# Patient Record
Sex: MALE | Race: BLACK OR AFRICAN AMERICAN | NOT HISPANIC OR LATINO | URBAN - METROPOLITAN AREA
[De-identification: names, ages, dates, MRNs, and addresses within clinical notes are randomized per-mention and may not be internally consistent; named-entity substitution may affect disease eponyms.]

---

## 2017-10-28 ENCOUNTER — EMERGENCY (EMERGENCY)
Facility: HOSPITAL | Age: 67
LOS: 0 days | Discharge: HOME | End: 2017-10-28
Attending: EMERGENCY MEDICINE
Payer: MEDICARE

## 2017-10-28 VITALS
TEMPERATURE: 98 F | DIASTOLIC BLOOD PRESSURE: 79 MMHG | RESPIRATION RATE: 16 BRPM | HEART RATE: 80 BPM | OXYGEN SATURATION: 96 % | WEIGHT: 179.9 LBS | HEIGHT: 69 IN | SYSTOLIC BLOOD PRESSURE: 166 MMHG

## 2017-10-28 VITALS
DIASTOLIC BLOOD PRESSURE: 70 MMHG | RESPIRATION RATE: 16 BRPM | TEMPERATURE: 98 F | OXYGEN SATURATION: 98 % | SYSTOLIC BLOOD PRESSURE: 160 MMHG | HEART RATE: 76 BPM

## 2017-10-28 DIAGNOSIS — S43.014A ANTERIOR DISLOCATION OF RIGHT HUMERUS, INITIAL ENCOUNTER: ICD-10-CM

## 2017-10-28 DIAGNOSIS — S43.034A INFERIOR DISLOCATION OF RIGHT HUMERUS, INITIAL ENCOUNTER: ICD-10-CM

## 2017-10-28 DIAGNOSIS — I25.10 ATHEROSCLEROTIC HEART DISEASE OF NATIVE CORONARY ARTERY WITHOUT ANGINA PECTORIS: ICD-10-CM
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
## 2017-10-28 DIAGNOSIS — M25.521 PAIN IN RIGHT ELBOW: ICD-10-CM

## 2017-10-28 DIAGNOSIS — M25.511 PAIN IN RIGHT SHOULDER: ICD-10-CM

## 2017-10-28 PROCEDURE — 73030 X-RAY EXAM OF SHOULDER: CPT | Mod: 26,77,RT

## 2017-10-28 PROCEDURE — 99284 EMERGENCY DEPT VISIT MOD MDM: CPT

## 2017-10-28 PROCEDURE — 73060 X-RAY EXAM OF HUMERUS: CPT | Mod: 26,RT

## 2017-10-28 PROCEDURE — 73080 X-RAY EXAM OF ELBOW: CPT | Mod: 26,RT

## 2017-10-28 PROCEDURE — 73030 X-RAY EXAM OF SHOULDER: CPT | Mod: 26,RT

## 2017-10-28 RX ORDER — MORPHINE SULFATE 50 MG/1
2 CAPSULE, EXTENDED RELEASE ORAL ONCE
Qty: 0 | Refills: 0 | Status: DISCONTINUED | OUTPATIENT
Start: 2017-10-28 | End: 2017-10-28

## 2017-10-28 RX ORDER — ACETAMINOPHEN 500 MG
650 TABLET ORAL ONCE
Qty: 0 | Refills: 0 | Status: COMPLETED | OUTPATIENT
Start: 2017-10-28 | End: 2017-10-28

## 2017-10-28 RX ADMIN — MORPHINE SULFATE 2 MILLIGRAM(S): 50 CAPSULE, EXTENDED RELEASE ORAL at 12:08

## 2017-10-28 RX ADMIN — Medication 650 MILLIGRAM(S): at 11:02

## 2017-10-28 NOTE — ED PROVIDER NOTE - OBJECTIVE STATEMENT
66 y/o mal ehx htn/cad c/o right shoulder/upper arm pain s/p mechanical trip and fall. States fell with arm somewhat stretched out in front of him. No loc, no hit head, no neck pain, no vomiting, no a/c. Denies cp/sob/abd pain, other extremity pain, neuro symptoms. Only pain is around shoulder region, nothing by clavicle.

## 2017-10-28 NOTE — ED ADULT NURSE NOTE - OBJECTIVE STATEMENT
Pt c/o of right shoulder pain. As per Pt " I slipped and fall while in the store. Denies any Head injury or LOC. Pt is not on any blood thinner

## 2017-10-28 NOTE — CONSULT NOTE ADULT - ASSESSMENT
A/P: 67y Male w R shoulder dislocation sp closed reduction  Pain control  NWB RUE in sling, keep c/d/i  Ice  Active movement of fingers/wrist/elbow encouraged  Possible need for surgical intervention in future discussed with patient  Follow up with Dr. Pang within 1 week, call office for appointment  Ortho stable for DC

## 2017-10-28 NOTE — ED PROVIDER NOTE - MEDICAL DECISION MAKING DETAILS
pt with shoulder dislocation ?bankart/hill sachs pre reduction. ortho consulted, to reduce. neurovascularly intact. no other injuries. likely reduce, sling, f/u ortho.

## 2017-10-28 NOTE — ED PROVIDER NOTE - PROGRESS NOTE DETAILS
Gabriela: ortho consulted, with pt now. Gabriela: ortho reduced, pt in shoulder immobilizer, feeling well, awake from valium, taking an uber home, sitting up eating without complaints, given results/ortho number and told to call asap and f/u this week, stay in immobilizer until sees doc within 48 hours.

## 2017-10-28 NOTE — CONSULT NOTE ADULT - SUBJECTIVE AND OBJECTIVE BOX
67y Male right hand dominant presents c/o R shoulder pain sp mechanical fall this morning. Denies Headstrike/LOC. Denies numbness, tingling paresthesias in affected extremity. Able to ambulate after fall. No other orthopedic injuries at this time    PAST MEDICAL & SURGICAL HISTORY:  HTN (hypertension)    MEDICATIONS  (STANDING):    Allergies    No Known Allergies    Intolerances      Imaging: XR demonstates R shoulder GHJ anterior dislocation    Vital Signs Last 24 Hrs  T(C): 36.7 (10-28-17 @ 10:11), Max: 36.7 (10-28-17 @ 10:11)  T(F): 98 (10-28-17 @ 10:11), Max: 98 (10-28-17 @ 10:11)  HR: 80 (10-28-17 @ 10:11) (80 - 80)  BP: 166/79 (10-28-17 @ 10:11) (166/79 - 166/79)  RR: 16 (10-28-17 @ 10:11) (16 - 16)  SpO2: 96% (10-28-17 @ 10:11) (96% - 96%)  Gen: NAD    RUE: Skin intact, +anterior shoulder fullness, +sulcus sign, unable to range shoulder 2/2 pain, +ain/pin/m/r/u function, SILT C5-T1, radial pulse intact, compartments soft, brisk cap refill     Procedure: 10cc 1% lidocaine with 10cc sterile saline injected under sterile procedure into R shoulder joint. Closed reduction performed. Post procedure imaging demonstrates located shoulder joint. Post procedure exam demonstrated NV intact.

## 2022-09-25 ENCOUNTER — EMERGENCY (EMERGENCY)
Facility: HOSPITAL | Age: 72
LOS: 0 days | Discharge: HOME | End: 2022-09-25
Attending: EMERGENCY MEDICINE

## 2022-09-25 VITALS
DIASTOLIC BLOOD PRESSURE: 92 MMHG | HEIGHT: 69 IN | OXYGEN SATURATION: 100 % | TEMPERATURE: 98 F | SYSTOLIC BLOOD PRESSURE: 198 MMHG | WEIGHT: 201.94 LBS | HEART RATE: 71 BPM | RESPIRATION RATE: 18 BRPM

## 2022-09-25 VITALS
HEART RATE: 78 BPM | SYSTOLIC BLOOD PRESSURE: 176 MMHG | DIASTOLIC BLOOD PRESSURE: 89 MMHG | OXYGEN SATURATION: 98 % | RESPIRATION RATE: 18 BRPM | TEMPERATURE: 98 F

## 2022-09-25 DIAGNOSIS — R11.2 NAUSEA WITH VOMITING, UNSPECIFIED: ICD-10-CM

## 2022-09-25 DIAGNOSIS — R11.10 VOMITING, UNSPECIFIED: ICD-10-CM

## 2022-09-25 DIAGNOSIS — Z20.822 CONTACT WITH AND (SUSPECTED) EXPOSURE TO COVID-19: ICD-10-CM

## 2022-09-25 DIAGNOSIS — I10 ESSENTIAL (PRIMARY) HYPERTENSION: ICD-10-CM

## 2022-09-25 DIAGNOSIS — I25.10 ATHEROSCLEROTIC HEART DISEASE OF NATIVE CORONARY ARTERY WITHOUT ANGINA PECTORIS: ICD-10-CM

## 2022-09-25 LAB
ALBUMIN SERPL ELPH-MCNC: 4.2 G/DL — SIGNIFICANT CHANGE UP (ref 3.3–5)
ALP SERPL-CCNC: 68 U/L — SIGNIFICANT CHANGE UP (ref 40–120)
ALT FLD-CCNC: 19 U/L — SIGNIFICANT CHANGE UP (ref 12–78)
ANION GAP SERPL CALC-SCNC: 5 MMOL/L — SIGNIFICANT CHANGE UP (ref 5–17)
AST SERPL-CCNC: 26 U/L — SIGNIFICANT CHANGE UP (ref 15–37)
BASOPHILS # BLD AUTO: 0.01 K/UL — SIGNIFICANT CHANGE UP (ref 0–0.2)
BASOPHILS NFR BLD AUTO: 0.2 % — SIGNIFICANT CHANGE UP (ref 0–2)
BILIRUB SERPL-MCNC: 0.5 MG/DL — SIGNIFICANT CHANGE UP (ref 0.2–1.2)
BUN SERPL-MCNC: 20 MG/DL — SIGNIFICANT CHANGE UP (ref 7–23)
CALCIUM SERPL-MCNC: 9.3 MG/DL — SIGNIFICANT CHANGE UP (ref 8.5–10.1)
CHLORIDE SERPL-SCNC: 105 MMOL/L — SIGNIFICANT CHANGE UP (ref 96–108)
CO2 SERPL-SCNC: 29 MMOL/L — SIGNIFICANT CHANGE UP (ref 22–31)
CREAT SERPL-MCNC: 0.95 MG/DL — SIGNIFICANT CHANGE UP (ref 0.5–1.3)
EGFR: 85 ML/MIN/1.73M2 — SIGNIFICANT CHANGE UP
EOSINOPHIL # BLD AUTO: 0.01 K/UL — SIGNIFICANT CHANGE UP (ref 0–0.5)
EOSINOPHIL NFR BLD AUTO: 0.2 % — SIGNIFICANT CHANGE UP (ref 0–6)
FLUAV AG NPH QL: SIGNIFICANT CHANGE UP
FLUBV AG NPH QL: SIGNIFICANT CHANGE UP
GLUCOSE SERPL-MCNC: 108 MG/DL — HIGH (ref 70–99)
HCT VFR BLD CALC: 40.1 % — SIGNIFICANT CHANGE UP (ref 39–50)
HGB BLD-MCNC: 13.2 G/DL — SIGNIFICANT CHANGE UP (ref 13–17)
IMM GRANULOCYTES NFR BLD AUTO: 0.4 % — SIGNIFICANT CHANGE UP (ref 0–0.9)
LIDOCAIN IGE QN: 125 U/L — SIGNIFICANT CHANGE UP (ref 73–393)
LYMPHOCYTES # BLD AUTO: 0.67 K/UL — LOW (ref 1–3.3)
LYMPHOCYTES # BLD AUTO: 13.6 % — SIGNIFICANT CHANGE UP (ref 13–44)
MAGNESIUM SERPL-MCNC: 1.9 MG/DL — SIGNIFICANT CHANGE UP (ref 1.6–2.6)
MCHC RBC-ENTMCNC: 29.7 PG — SIGNIFICANT CHANGE UP (ref 27–34)
MCHC RBC-ENTMCNC: 32.9 G/DL — SIGNIFICANT CHANGE UP (ref 32–36)
MCV RBC AUTO: 90.1 FL — SIGNIFICANT CHANGE UP (ref 80–100)
MONOCYTES # BLD AUTO: 0.22 K/UL — SIGNIFICANT CHANGE UP (ref 0–0.9)
MONOCYTES NFR BLD AUTO: 4.5 % — SIGNIFICANT CHANGE UP (ref 2–14)
NEUTROPHILS # BLD AUTO: 3.98 K/UL — SIGNIFICANT CHANGE UP (ref 1.8–7.4)
NEUTROPHILS NFR BLD AUTO: 81.1 % — HIGH (ref 43–77)
NRBC # BLD: 0 /100 WBCS — SIGNIFICANT CHANGE UP (ref 0–0)
PLATELET # BLD AUTO: 191 K/UL — SIGNIFICANT CHANGE UP (ref 150–400)
POTASSIUM SERPL-MCNC: 5.1 MMOL/L — SIGNIFICANT CHANGE UP (ref 3.5–5.3)
POTASSIUM SERPL-SCNC: 5.1 MMOL/L — SIGNIFICANT CHANGE UP (ref 3.5–5.3)
PROT SERPL-MCNC: 7.9 GM/DL — SIGNIFICANT CHANGE UP (ref 6–8.3)
RBC # BLD: 4.45 M/UL — SIGNIFICANT CHANGE UP (ref 4.2–5.8)
RBC # FLD: 13.6 % — SIGNIFICANT CHANGE UP (ref 10.3–14.5)
SARS-COV-2 RNA SPEC QL NAA+PROBE: SIGNIFICANT CHANGE UP
SODIUM SERPL-SCNC: 139 MMOL/L — SIGNIFICANT CHANGE UP (ref 135–145)
TROPONIN I, HIGH SENSITIVITY RESULT: 11.4 NG/L — SIGNIFICANT CHANGE UP
WBC # BLD: 4.91 K/UL — SIGNIFICANT CHANGE UP (ref 3.8–10.5)
WBC # FLD AUTO: 4.91 K/UL — SIGNIFICANT CHANGE UP (ref 3.8–10.5)

## 2022-09-25 PROCEDURE — 99284 EMERGENCY DEPT VISIT MOD MDM: CPT

## 2022-09-25 RX ORDER — SODIUM CHLORIDE 9 MG/ML
2000 INJECTION, SOLUTION INTRAVENOUS ONCE
Refills: 0 | Status: COMPLETED | OUTPATIENT
Start: 2022-09-25 | End: 2022-09-25

## 2022-09-25 RX ORDER — ONDANSETRON 8 MG/1
8 TABLET, FILM COATED ORAL ONCE
Refills: 0 | Status: COMPLETED | OUTPATIENT
Start: 2022-09-25 | End: 2022-09-25

## 2022-09-25 RX ORDER — ONDANSETRON 8 MG/1
1 TABLET, FILM COATED ORAL
Qty: 10 | Refills: 0
Start: 2022-09-25 | End: 2022-09-29

## 2022-09-25 RX ADMIN — ONDANSETRON 8 MILLIGRAM(S): 8 TABLET, FILM COATED ORAL at 18:22

## 2022-09-25 RX ADMIN — ONDANSETRON 8 MILLIGRAM(S): 8 TABLET, FILM COATED ORAL at 20:32

## 2022-09-25 RX ADMIN — SODIUM CHLORIDE 2000 MILLILITER(S): 9 INJECTION, SOLUTION INTRAVENOUS at 18:22

## 2022-09-25 NOTE — ED ADULT TRIAGE NOTE - CHIEF COMPLAINT QUOTE
pt c/o vomiting started this am. zofran 8mg ivp given via right hand #20G iv heplock. pt continuous vomiting. pt denies abdominal pain, diarrhea. hx: htn, cardiac stent x 2.

## 2022-09-25 NOTE — ED PROVIDER NOTE - PATIENT PORTAL LINK FT
You can access the FollowMyHealth Patient Portal offered by [facility] by registering at the following website: [email or web address]. By joining [facility]’s FollowMyHealth portal, you will also be able to view your health information using other applications (apps) compatible with our system.

## 2022-09-25 NOTE — ED PROVIDER NOTE - PROGRESS NOTE DETAILS
[place]: Pt care signed out to me at change of shift, pending labs. W/o w/u significant abnormalities. On re-eval, resting comfortably, in NAD. Pt tolerates PO intake in the ED. Stable for d/c home. Given script for Zofran. Instructed for close outpatient PCP f/u. Return signs / symptoms d/w pt, girlfriend. They understand / agree w/ this plan.

## 2022-09-25 NOTE — ED PROVIDER NOTE - OBJECTIVE STATEMENT
72y M with PMHx of HTN, coronary artery disease, presenting to the ED c/o of vomiting x1 day. Patient states vomiting occurred today while in [employer]. Of note, the patient reportedly consumed juares goat last night. Denies abd pain, n/d, fever, or chills.

## 2022-09-25 NOTE — ED ADULT NURSE NOTE - OBJECTIVE STATEMENT
c/o sudden onset of dizziness followed by n/v. Admits to having curried goat for dinner last night. Denies abdominal pains.

## 2022-09-26 PROBLEM — I10 ESSENTIAL (PRIMARY) HYPERTENSION: Chronic | Status: ACTIVE | Noted: 2017-10-28

## 2022-10-13 NOTE — ED ADULT NURSE NOTE - CINV DISCH TEACH PARTICIP
Patient requesting refill(s) of: lisinopril 20 mg daily    Last filled: 4/22/2022 #90 x 1   Last appt: 10/7/2022  Next appt: 1/30/2023  Pharmacy: [facility]
Patient

## 2023-06-21 PROBLEM — Z00.00 ENCOUNTER FOR PREVENTIVE HEALTH EXAMINATION: Status: ACTIVE | Noted: 2023-06-21

## 2023-07-26 ENCOUNTER — APPOINTMENT (OUTPATIENT)
Dept: HEART AND VASCULAR | Facility: CLINIC | Age: 73
End: 2023-07-26
Payer: MEDICARE

## 2023-07-26 ENCOUNTER — NON-APPOINTMENT (OUTPATIENT)
Age: 73
End: 2023-07-26

## 2023-07-26 VITALS
OXYGEN SATURATION: 96 % | SYSTOLIC BLOOD PRESSURE: 144 MMHG | BODY MASS INDEX: 30.04 KG/M2 | DIASTOLIC BLOOD PRESSURE: 72 MMHG | HEIGHT: 69 IN | WEIGHT: 202.8 LBS | HEART RATE: 84 BPM

## 2023-07-26 DIAGNOSIS — R93.1 ABNORMAL FINDINGS ON DIAGNOSTIC IMAGING OF HEART AND CORONARY CIRCULATION: ICD-10-CM

## 2023-07-26 DIAGNOSIS — E78.00 PURE HYPERCHOLESTEROLEMIA, UNSPECIFIED: ICD-10-CM

## 2023-07-26 DIAGNOSIS — I25.10 ATHEROSCLEROTIC HEART DISEASE OF NATIVE CORONARY ARTERY W/OUT ANGINA PECTORIS: ICD-10-CM

## 2023-07-26 DIAGNOSIS — R73.03 PREDIABETES.: ICD-10-CM

## 2023-07-26 DIAGNOSIS — Z78.9 OTHER SPECIFIED HEALTH STATUS: ICD-10-CM

## 2023-07-26 PROCEDURE — 99204 OFFICE O/P NEW MOD 45 MIN: CPT

## 2023-07-26 RX ORDER — ROSUVASTATIN CALCIUM 40 MG/1
40 TABLET, FILM COATED ORAL DAILY
Qty: 90 | Refills: 3 | Status: ACTIVE | COMMUNITY
Start: 2023-07-26 | End: 1900-01-01

## 2023-07-26 RX ORDER — METFORMIN HYDROCHLORIDE 500 MG/1
500 TABLET, COATED ORAL
Qty: 90 | Refills: 3 | Status: ACTIVE | COMMUNITY
Start: 2023-07-26 | End: 1900-01-01

## 2023-07-26 RX ORDER — METOPROLOL TARTRATE 50 MG/1
50 TABLET, FILM COATED ORAL DAILY
Refills: 0 | Status: ACTIVE | COMMUNITY

## 2023-07-26 NOTE — HISTORY OF PRESENT ILLNESS
[FreeTextEntry1] : 77 y/o M with a hx of HTN and CAD with 2 prior PCI done at [facility], who presents as a new patient visit. \par No CP, SOB, orthopnea, PND, LE edema. Occasionally with LE pain and tingling, but otherwise no cardiac complaints. non smoker. social alcohol consumption.\par No family hx of heart disease. \par No prior cath or echo reports available. \par EKG NSR, no ischemic changes, some TW flattening in V3-V4\par \par Meds: Toprol 25, Aspirin 81, Crestor (unknown dose)

## 2023-09-13 ENCOUNTER — APPOINTMENT (OUTPATIENT)
Dept: CT IMAGING | Facility: HOSPITAL | Age: 73
End: 2023-09-13

## 2023-09-13 ENCOUNTER — OUTPATIENT (OUTPATIENT)
Dept: OUTPATIENT SERVICES | Facility: HOSPITAL | Age: 73
LOS: 1 days | End: 2023-09-13
Payer: MEDICARE

## 2023-09-13 DIAGNOSIS — R93.1 ABNORMAL FINDINGS ON DIAGNOSTIC IMAGING OF HEART AND CORONARY CIRCULATION: ICD-10-CM

## 2023-09-13 PROCEDURE — 93306 TTE W/DOPPLER COMPLETE: CPT | Mod: 26

## 2023-09-13 PROCEDURE — 93306 TTE W/DOPPLER COMPLETE: CPT

## 2023-09-13 PROCEDURE — 75574 CT ANGIO HRT W/3D IMAGE: CPT | Mod: 26

## 2023-09-13 PROCEDURE — 75574 CT ANGIO HRT W/3D IMAGE: CPT

## 2023-09-19 VITALS
OXYGEN SATURATION: 100 % | HEIGHT: 69 IN | DIASTOLIC BLOOD PRESSURE: 62 MMHG | WEIGHT: 203.93 LBS | TEMPERATURE: 97 F | SYSTOLIC BLOOD PRESSURE: 130 MMHG | HEART RATE: 62 BPM | RESPIRATION RATE: 16 BRPM

## 2023-09-19 RX ORDER — CHLORHEXIDINE GLUCONATE 213 G/1000ML
1 SOLUTION TOPICAL ONCE
Refills: 0 | Status: DISCONTINUED | OUTPATIENT
Start: 2023-09-25 | End: 2023-10-09

## 2023-09-19 NOTE — H&P ADULT - NSHPLABSRESULTS_GEN_ALL_CORE
13.1   3.28  )-----------( 187      ( 25 Sep 2023 13:11 )             38.7       09-25    138  |  103  |  14  ----------------------------<  82  4.4   |  25  |  0.86    Ca    9.0      25 Sep 2023 12:29  Mg     2.1     09-25    TPro  7.2  /  Alb  4.4  /  TBili  0.6  /  DBili  x   /  AST  43<H>  /  ALT  30  /  AlkPhos  57  09-25      PT/INR - ( 25 Sep 2023 13:11 )   PT: 12.5 sec;   INR: 1.10          PTT - ( 25 Sep 2023 13:11 )  PTT:32.7 sec    CARDIAC MARKERS ( 25 Sep 2023 12:29 )  x     / x     / 764 U/L / x     / 4.2 ng/mL        Urinalysis Basic - ( 25 Sep 2023 12:29 )    Color: x / Appearance: x / SG: x / pH: x  Gluc: 82 mg/dL / Ketone: x  / Bili: x / Urobili: x   Blood: x / Protein: x / Nitrite: x   Leuk Esterase: x / RBC: x / WBC x   Sq Epi: x / Non Sq Epi: x / Bacteria: x        EKG: 13.1   3.28  )-----------( 187      ( 25 Sep 2023 13:11 )             38.7       09-25    138  |  103  |  14  ----------------------------<  82  4.4   |  25  |  0.86    Ca    9.0      25 Sep 2023 12:29  Mg     2.1     09-25    TPro  7.2  /  Alb  4.4  /  TBili  0.6  /  DBili  x   /  AST  43<H>  /  ALT  30  /  AlkPhos  57  09-25      PT/INR - ( 25 Sep 2023 13:11 )   PT: 12.5 sec;   INR: 1.10          PTT - ( 25 Sep 2023 13:11 )  PTT:32.7 sec    CARDIAC MARKERS ( 25 Sep 2023 12:29 )  x     / x     / 764 U/L / x     / 4.2 ng/mL        Urinalysis Basic - ( 25 Sep 2023 12:29 )    Color: x / Appearance: x / SG: x / pH: x  Gluc: 82 mg/dL / Ketone: x  / Bili: x / Urobili: x   Blood: x / Protein: x / Nitrite: x   Leuk Esterase: x / RBC: x / WBC x   Sq Epi: x / Non Sq Epi: x / Bacteria: x        EKG: sinus ruby 58 BPM with TWI V4-V6.

## 2023-09-19 NOTE — H&P ADULT - NSICDXPASTMEDICALHX_GEN_ALL_CORE_FT
PAST MEDICAL HISTORY:  CAD (coronary artery disease)     HLD (hyperlipidemia)     HTN (hypertension)     Systolic HF (heart failure)

## 2023-09-19 NOTE — H&P ADULT - ASSESSMENT
73 year old M, with PMHx HTN, HLD, pre-DM, CAD (s/p PCI x2 in past at [facility]), HFmrEF (40-45% 9/13) who in light of risk factors, CCS class III anginal equivalent symptoms and abnormal TTE and CCTA, now presents for cardiac catheterization with possible intervention if clinically indicated.     EKG: 			  ASA III	Mallampati class: II   Anginal Equivalent Class: III     -H/H = . Pt denies BRBPR, hematuria, hematochezia, melena. Pt endorses compliance w/ home Aspirin, stating last dose was 9/25/23. Pt loaded w/  Plavix 600 mg x1  -BUN/Cr =   -EF 40-45% by echo. Euvolemic on exam. IV NS @ 250cc bolus followed by 50cc/hr x 2 hrs started pre procedure    Sedation Plan:   Moderate  Patient Is Suitable Candidate For Sedation?     Yes    Risks & benefits of procedure and alternative therapy have been explained to the patient including but not limited to: allergic reaction, bleeding with possible need for blood transfusion, infection, renal and vascular compromise, limb damage, arrhythmia, stroke, vessel dissection/perforation, myocardial infarction, and emergent CABG. Informed consent obtained at bedside and included in chart.   73 year old M, with PMHx HTN, HLD, pre-DM, CAD (s/p PCI x2 in past at [facility]), HFmrEF (40-45% 9/13) who in light of risk factors, CCS class III anginal equivalent symptoms and abnormal TTE and CCTA, now presents for cardiac catheterization with possible intervention if clinically indicated.     EKG: 			  ASA III	Mallampati class: II   Anginal Equivalent Class: III     -H/H = . Pt denies BRBPR, hematuria, hematochezia, melena. Pt endorses compliance w/ home Aspirin, stating last dose was 9/25/23. Pt loaded w/  Plavix 600 mg x1  -BUN/Cr = 14/0.86  -EF 40-45% by echo. Euvolemic on exam. IV NS @ 250cc bolus followed by 50cc/hr x 2 hrs started pre procedure    Sedation Plan:   Moderate  Patient Is Suitable Candidate For Sedation?     Yes    Risks & benefits of procedure and alternative therapy have been explained to the patient including but not limited to: allergic reaction, bleeding with possible need for blood transfusion, infection, renal and vascular compromise, limb damage, arrhythmia, stroke, vessel dissection/perforation, myocardial infarction, and emergent CABG. Informed consent obtained at bedside and included in chart.

## 2023-09-19 NOTE — H&P ADULT - HISTORY OF PRESENT ILLNESS
Pharmacy: _______  Cardiologist: Dr EL Escobar  Escort: _______    73 year old M, with PMHx HTN, HLD, pre-DM, CAD (s/p PCI x2 in past at [facility]), HFmrEF (40-45% 9/13) presented to Dr Escobar with c/o progressively worsening EID, occurs with minimal exertion (now unable to ambulate two blocks), relieved by rest; not associated with CP. Patient otherwise denies ______ dizziness, palpitations, orthopnea/PND, leg swelling, LOC, bleeding, melena/hematochezia, fever, chills, URI symptoms, or recent illness. Patient underwent CCTA 9/13 revealing severe stenosis in RPL as well as in OM1 followed by patent stent; patent m-dLAD stent; calcified plaque in D1 where significant stenosis cannot be excluded; less than 25% stenosis of LMain; RPDA not well visualized. Patient also underwent TTE 9/13 which revealed mild symmetric LVH w/ LVEF 40-45%; apical septal, apical inferior, and apex segments akinetic, with apical lateral segment hypokineic; G1DD; aortic sclerosis w/o significant stenosis; otherwise normal TTE.     In light of risk factors, CCS class III anginal symptoms and abnormal TTE and CCTA, pt now presents for cardiac catheterization with possible intervention if clinically indicated.    Pharmacy: [email or web address] [id number] [facility] ([phone])   Cardiologist: Dr EL Escobar  Escort: Friend     73 year old M, with PMHx HTN, HLD, pre-DM, CAD (s/p PCI x2 in past at [facility]), HFmrEF (40-45% 9/13) presented to Dr Escobar with c/o progressively worsening EID, occurs with minimal exertion (now unable to ambulate two blocks), relieved by rest; not associated with CP. Patient otherwise denies CP, dizziness, palpitations, orthopnea/PND, leg swelling, LOC, bleeding, melena/hematochezia, fever, chills, URI symptoms, or recent illness. Patient underwent CCTA 9/13 revealing severe stenosis in RPL as well as in OM1 followed by patent stent; patent m-dLAD stent; calcified plaque in D1 where significant stenosis cannot be excluded; less than 25% stenosis of LMain; RPDA not well visualized. Patient also underwent TTE 9/13 which revealed mild symmetric LVH w/ LVEF 40-45%; apical septal, apical inferior, and apex segments akinetic, with apical lateral segment hypokineic; G1DD; aortic sclerosis w/o significant stenosis; otherwise normal TTE. In light of risk factors, CCS class III anginal equivalent symptoms and abnormal TTE and CCTA, pt now presents for cardiac catheterization with possible intervention if clinically indicated.

## 2023-09-25 ENCOUNTER — OUTPATIENT (OUTPATIENT)
Dept: OUTPATIENT SERVICES | Facility: HOSPITAL | Age: 73
LOS: 1 days | Discharge: HOME | End: 2023-09-25
Payer: MEDICARE

## 2023-09-25 LAB
A1C WITH ESTIMATED AVERAGE GLUCOSE RESULT: 6.3 % — HIGH (ref 4–5.6)
ALBUMIN SERPL ELPH-MCNC: 4.4 G/DL — SIGNIFICANT CHANGE UP (ref 3.3–5)
ALP SERPL-CCNC: 57 U/L — SIGNIFICANT CHANGE UP (ref 40–120)
ALT FLD-CCNC: 30 U/L — SIGNIFICANT CHANGE UP (ref 10–45)
ANION GAP SERPL CALC-SCNC: 10 MMOL/L — SIGNIFICANT CHANGE UP (ref 5–17)
APTT BLD: 32.7 SEC — SIGNIFICANT CHANGE UP (ref 24.5–35.6)
AST SERPL-CCNC: 43 U/L — HIGH (ref 10–40)
BASOPHILS # BLD AUTO: 0.01 K/UL — SIGNIFICANT CHANGE UP (ref 0–0.2)
BASOPHILS NFR BLD AUTO: 0.3 % — SIGNIFICANT CHANGE UP (ref 0–2)
BILIRUB SERPL-MCNC: 0.6 MG/DL — SIGNIFICANT CHANGE UP (ref 0.2–1.2)
BUN SERPL-MCNC: 14 MG/DL — SIGNIFICANT CHANGE UP (ref 7–23)
CALCIUM SERPL-MCNC: 9 MG/DL — SIGNIFICANT CHANGE UP (ref 8.4–10.5)
CHLORIDE SERPL-SCNC: 103 MMOL/L — SIGNIFICANT CHANGE UP (ref 96–108)
CHOLEST SERPL-MCNC: 100 MG/DL — SIGNIFICANT CHANGE UP
CK MB CFR SERPL CALC: 4.2 NG/ML — SIGNIFICANT CHANGE UP (ref 0–6.7)
CK SERPL-CCNC: 764 U/L — HIGH (ref 30–200)
CO2 SERPL-SCNC: 25 MMOL/L — SIGNIFICANT CHANGE UP (ref 22–31)
CREAT SERPL-MCNC: 0.86 MG/DL — SIGNIFICANT CHANGE UP (ref 0.5–1.3)
EGFR: 91 ML/MIN/1.73M2 — SIGNIFICANT CHANGE UP
EOSINOPHIL # BLD AUTO: 0.07 K/UL — SIGNIFICANT CHANGE UP (ref 0–0.5)
EOSINOPHIL NFR BLD AUTO: 2.1 % — SIGNIFICANT CHANGE UP (ref 0–6)
ESTIMATED AVERAGE GLUCOSE: 134 MG/DL — HIGH (ref 68–114)
GLUCOSE SERPL-MCNC: 82 MG/DL — SIGNIFICANT CHANGE UP (ref 70–99)
HCT VFR BLD CALC: 38.7 % — LOW (ref 39–50)
HCV AB S/CO SERPL IA: 0.04 S/CO — SIGNIFICANT CHANGE UP
HCV AB SERPL-IMP: SIGNIFICANT CHANGE UP
HDLC SERPL-MCNC: 32 MG/DL — LOW
HGB BLD-MCNC: 13.1 G/DL — SIGNIFICANT CHANGE UP (ref 13–17)
IMM GRANULOCYTES NFR BLD AUTO: 0 % — SIGNIFICANT CHANGE UP (ref 0–0.9)
INR BLD: 1.1 — SIGNIFICANT CHANGE UP (ref 0.85–1.18)
LIPID PNL WITH DIRECT LDL SERPL: 56 MG/DL — SIGNIFICANT CHANGE UP
LYMPHOCYTES # BLD AUTO: 1.63 K/UL — SIGNIFICANT CHANGE UP (ref 1–3.3)
LYMPHOCYTES # BLD AUTO: 49.7 % — HIGH (ref 13–44)
MAGNESIUM SERPL-MCNC: 2 MG/DL — SIGNIFICANT CHANGE UP (ref 1.6–2.6)
MAGNESIUM SERPL-MCNC: 2.1 MG/DL — SIGNIFICANT CHANGE UP (ref 1.6–2.6)
MCHC RBC-ENTMCNC: 31 PG — SIGNIFICANT CHANGE UP (ref 27–34)
MCHC RBC-ENTMCNC: 33.9 GM/DL — SIGNIFICANT CHANGE UP (ref 32–36)
MCV RBC AUTO: 91.5 FL — SIGNIFICANT CHANGE UP (ref 80–100)
MONOCYTES # BLD AUTO: 0.22 K/UL — SIGNIFICANT CHANGE UP (ref 0–0.9)
MONOCYTES NFR BLD AUTO: 6.7 % — SIGNIFICANT CHANGE UP (ref 2–14)
NEUTROPHILS # BLD AUTO: 1.35 K/UL — LOW (ref 1.8–7.4)
NEUTROPHILS NFR BLD AUTO: 41.2 % — LOW (ref 43–77)
NON HDL CHOLESTEROL: 68 MG/DL — SIGNIFICANT CHANGE UP
NRBC # BLD: 0 /100 WBCS — SIGNIFICANT CHANGE UP (ref 0–0)
PLATELET # BLD AUTO: 187 K/UL — SIGNIFICANT CHANGE UP (ref 150–400)
POTASSIUM SERPL-MCNC: 4.4 MMOL/L — SIGNIFICANT CHANGE UP (ref 3.5–5.3)
POTASSIUM SERPL-SCNC: 4.4 MMOL/L — SIGNIFICANT CHANGE UP (ref 3.5–5.3)
PROT SERPL-MCNC: 7.2 G/DL — SIGNIFICANT CHANGE UP (ref 6–8.3)
PROTHROM AB SERPL-ACNC: 12.5 SEC — SIGNIFICANT CHANGE UP (ref 9.5–13)
RBC # BLD: 4.23 M/UL — SIGNIFICANT CHANGE UP (ref 4.2–5.8)
RBC # FLD: 13.8 % — SIGNIFICANT CHANGE UP (ref 10.3–14.5)
SODIUM SERPL-SCNC: 138 MMOL/L — SIGNIFICANT CHANGE UP (ref 135–145)
TRIGL SERPL-MCNC: 61 MG/DL — SIGNIFICANT CHANGE UP
WBC # BLD: 3.28 K/UL — LOW (ref 3.8–10.5)
WBC # FLD AUTO: 3.28 K/UL — LOW (ref 3.8–10.5)

## 2023-09-25 PROCEDURE — 86803 HEPATITIS C AB TEST: CPT

## 2023-09-25 PROCEDURE — 36415 COLL VENOUS BLD VENIPUNCTURE: CPT

## 2023-09-25 PROCEDURE — 83735 ASSAY OF MAGNESIUM: CPT

## 2023-09-25 PROCEDURE — 82550 ASSAY OF CK (CPK): CPT

## 2023-09-25 PROCEDURE — C1769: CPT

## 2023-09-25 PROCEDURE — 85025 COMPLETE CBC W/AUTO DIFF WBC: CPT

## 2023-09-25 PROCEDURE — 93571 IV DOP VEL&/PRESS C FLO 1ST: CPT | Mod: 26,52,LD

## 2023-09-25 PROCEDURE — 85347 COAGULATION TIME ACTIVATED: CPT

## 2023-09-25 PROCEDURE — 93454 CORONARY ARTERY ANGIO S&I: CPT | Mod: 26

## 2023-09-25 PROCEDURE — 99152 MOD SED SAME PHYS/QHP 5/>YRS: CPT

## 2023-09-25 PROCEDURE — 85610 PROTHROMBIN TIME: CPT

## 2023-09-25 PROCEDURE — 93010 ELECTROCARDIOGRAM REPORT: CPT | Mod: 59

## 2023-09-25 PROCEDURE — 80053 COMPREHEN METABOLIC PANEL: CPT

## 2023-09-25 PROCEDURE — 82553 CREATINE MB FRACTION: CPT

## 2023-09-25 PROCEDURE — C1887: CPT

## 2023-09-25 PROCEDURE — 83036 HEMOGLOBIN GLYCOSYLATED A1C: CPT

## 2023-09-25 PROCEDURE — C1894: CPT

## 2023-09-25 PROCEDURE — 80061 LIPID PANEL: CPT

## 2023-09-25 PROCEDURE — 93454 CORONARY ARTERY ANGIO S&I: CPT | Mod: 59

## 2023-09-25 PROCEDURE — 93799 UNLISTED CV SVC/PROCEDURE: CPT

## 2023-09-25 PROCEDURE — 85730 THROMBOPLASTIN TIME PARTIAL: CPT

## 2023-09-25 PROCEDURE — 93005 ELECTROCARDIOGRAM TRACING: CPT

## 2023-09-25 RX ORDER — SODIUM CHLORIDE 9 MG/ML
250 INJECTION INTRAMUSCULAR; INTRAVENOUS; SUBCUTANEOUS ONCE
Refills: 0 | Status: COMPLETED | OUTPATIENT
Start: 2023-09-25 | End: 2023-09-25

## 2023-09-25 RX ORDER — SODIUM CHLORIDE 9 MG/ML
500 INJECTION INTRAMUSCULAR; INTRAVENOUS; SUBCUTANEOUS
Refills: 0 | Status: DISCONTINUED | OUTPATIENT
Start: 2023-09-25 | End: 2023-09-25

## 2023-09-25 RX ORDER — METOPROLOL TARTRATE 50 MG
1 TABLET ORAL
Refills: 0 | DISCHARGE

## 2023-09-25 RX ORDER — CHOLECALCIFEROL (VITAMIN D3) 125 MCG
1 CAPSULE ORAL
Refills: 0 | DISCHARGE

## 2023-09-25 RX ORDER — METFORMIN HYDROCHLORIDE 850 MG/1
1 TABLET ORAL
Refills: 0 | DISCHARGE

## 2023-09-25 RX ORDER — CLOPIDOGREL BISULFATE 75 MG/1
600 TABLET, FILM COATED ORAL ONCE
Refills: 0 | Status: COMPLETED | OUTPATIENT
Start: 2023-09-25 | End: 2023-09-25

## 2023-09-25 RX ORDER — ASPIRIN/CALCIUM CARB/MAGNESIUM 324 MG
1 TABLET ORAL
Refills: 0 | DISCHARGE

## 2023-09-25 RX ORDER — ROSUVASTATIN CALCIUM 5 MG/1
1 TABLET ORAL
Refills: 0 | DISCHARGE

## 2023-09-25 RX ORDER — SODIUM CHLORIDE 9 MG/ML
500 INJECTION INTRAMUSCULAR; INTRAVENOUS; SUBCUTANEOUS
Refills: 0 | Status: DISCONTINUED | OUTPATIENT
Start: 2023-09-25 | End: 2023-10-09

## 2023-09-25 RX ADMIN — SODIUM CHLORIDE 50 MILLILITER(S): 9 INJECTION INTRAMUSCULAR; INTRAVENOUS; SUBCUTANEOUS at 15:28

## 2023-09-25 RX ADMIN — SODIUM CHLORIDE 50 MILLILITER(S): 9 INJECTION INTRAMUSCULAR; INTRAVENOUS; SUBCUTANEOUS at 13:29

## 2023-09-25 RX ADMIN — SODIUM CHLORIDE 500 MILLILITER(S): 9 INJECTION INTRAMUSCULAR; INTRAVENOUS; SUBCUTANEOUS at 13:29

## 2023-09-25 RX ADMIN — CLOPIDOGREL BISULFATE 600 MILLIGRAM(S): 75 TABLET, FILM COATED ORAL at 13:29

## 2023-09-25 NOTE — PROGRESS NOTE ADULT - SUBJECTIVE AND OBJECTIVE BOX
Interventional Cardiology PA SDA Discharge Note    Patient without complaints. Ambulated and voided without difficulties    Afebrile, VSS    Ext:    	  		        Right     Radial/brachial :  no  hematoma,  no   bleeding, dressing; C/D/I      Pulses:    intact RAD/brachial to baseline     A/P:        74 y/o M, with PMHx HTN, HLD, pre-DM, CAD (s/p PCI x2 in past at [facility]), HFmrEF (40-45% 9/13) presented to Dr Escobar with c/o progressively worsening EID, occurs with minimal exertion (now unable to ambulate two blocks), relieved by rest; not associated with CP. Patient otherwise denies CP, dizziness, palpitations, orthopnea/PND, leg swelling, LOC, bleeding, melena/hematochezia, fever, chills, URI symptoms, or recent illness. Patient underwent CCTA 9/13 revealing severe stenosis in RPL as well as in OM1 followed by patent stent; patent m-dLAD stent; calcified plaque in D1 where significant stenosis cannot be excluded; less than 25% stenosis of LMain; RPDA not well visualized. Patient also underwent TTE 9/13 which revealed mild symmetric LVH w/ LVEF 40-45%; apical septal, apical inferior, and apex segments akinetic, with apical lateral segment hypokineic; G1DD; aortic sclerosis w/o significant stenosis; otherwise normal TTE. In light of risk factors, CCS class III anginal equivalent symptoms and abnormal TTE and CCTA, pt now presents for cardiac catheterization with possible intervention if clinically indicated.     Pt s/p Diag Cardiac cath 9/25/23. :  mod Dz mLAD (IFR neg 0.94), apical LAD  (very distal, no intervention needed). Lcx 30%, RCA 50-60%), R Rad access. As per IC fellow, plan to  uptritrate meds at o/p office in 2-4 wks)            1.	Stable for discharge as per attending Dr. Maher________ after bed rest, pt voids, wrist/brachial stable and 30 minutes of ambulation.  2.	Follow-up with PMD/Cardiologist ____Dr. Escobar_______ in 1-2 weeks  3.	Discharged forms signed and copies in chart

## 2023-09-26 LAB
ISTAT INR: 1.3 — HIGH (ref 0.88–1.16)
ISTAT PT: 15.7 SEC — HIGH (ref 10–12.9)

## 2023-10-02 DIAGNOSIS — I50.89 OTHER HEART FAILURE: ICD-10-CM

## 2023-10-02 DIAGNOSIS — I10 ESSENTIAL (PRIMARY) HYPERTENSION: ICD-10-CM

## 2023-10-02 DIAGNOSIS — I25.110 ATHEROSCLEROTIC HEART DISEASE OF NATIVE CORONARY ARTERY WITH UNSTABLE ANGINA PECTORIS: ICD-10-CM

## 2023-10-02 DIAGNOSIS — Z95.5 PRESENCE OF CORONARY ANGIOPLASTY IMPLANT AND GRAFT: ICD-10-CM

## 2023-10-02 DIAGNOSIS — E78.5 HYPERLIPIDEMIA, UNSPECIFIED: ICD-10-CM

## 2023-10-02 LAB
ISTAT ACTK (ACTIVATED CLOTTING TIME KAOLIN): 275 SEC — HIGH (ref 74–137)
ISTAT ACTK (ACTIVATED CLOTTING TIME KAOLIN): 811 SEC — HIGH (ref 74–137)

## 2023-10-11 ENCOUNTER — NON-APPOINTMENT (OUTPATIENT)
Age: 73
End: 2023-10-11

## 2023-12-28 ENCOUNTER — APPOINTMENT (OUTPATIENT)
Dept: GASTROENTEROLOGY | Facility: CLINIC | Age: 73
End: 2023-12-28
Payer: MEDICARE

## 2023-12-28 VITALS
BODY MASS INDEX: 29.77 KG/M2 | SYSTOLIC BLOOD PRESSURE: 145 MMHG | TEMPERATURE: 97.4 F | HEIGHT: 69 IN | WEIGHT: 201 LBS | DIASTOLIC BLOOD PRESSURE: 90 MMHG | RESPIRATION RATE: 15 BRPM | OXYGEN SATURATION: 98 % | HEART RATE: 69 BPM

## 2023-12-28 DIAGNOSIS — Z12.11 ENCOUNTER FOR SCREENING FOR MALIGNANT NEOPLASM OF COLON: ICD-10-CM

## 2023-12-28 PROBLEM — I50.20 UNSPECIFIED SYSTOLIC (CONGESTIVE) HEART FAILURE: Chronic | Status: ACTIVE | Noted: 2023-09-19

## 2023-12-28 PROBLEM — E78.5 HYPERLIPIDEMIA, UNSPECIFIED: Chronic | Status: ACTIVE | Noted: 2023-09-19

## 2023-12-28 PROBLEM — I25.10 ATHEROSCLEROTIC HEART DISEASE OF NATIVE CORONARY ARTERY WITHOUT ANGINA PECTORIS: Chronic | Status: ACTIVE | Noted: 2023-09-19

## 2023-12-28 PROCEDURE — 99203 OFFICE O/P NEW LOW 30 MIN: CPT

## 2023-12-28 RX ORDER — SODIUM SULFATE, POTASSIUM SULFATE AND MAGNESIUM SULFATE 1.6; 3.13; 17.5 G/177ML; G/177ML; G/177ML
17.5-3.13-1.6 SOLUTION ORAL
Qty: 1 | Refills: 0 | Status: ACTIVE | COMMUNITY
Start: 2023-12-28 | End: 1900-01-01

## 2023-12-28 NOTE — HISTORY OF PRESENT ILLNESS
[FreeTextEntry1] : 73M with pmh pre DM, HLD, CAD s/p PCI at [facility] (2017) referred by PCP for colon cancer screening.  Denies any GI complaints: diarrhea, constipation, rectal bleeding, melena, odynophagia, dysphagia, heart burn, epigastric/ abdominal pain, n/v, unintentional weight loss  Last colonoscopy many years ago in [place], cannot remember when. Noted to have polyps  Family Hx: no Gi malignancy; brother with lymphoma Tob never ETOH never Drug use never Meds: ASA 81, metformin, rosuvastatin, metoprolol, vit d, vit c  Notes EID first few min of walking that then resolves after a few minutes and then becomes normal afterwards.  Underwent complete cardiac work up with Cath, echo and all unremarkable.

## 2023-12-28 NOTE — END OF VISIT
[] : Fellow [FreeTextEntry3] : 73M with a h/o pre-DM, HLD, CAD s/p PCI at [facility] (2017) referred by PCP for colon cancer screening. Schedule at [facility] with suprep.   Joe Eaton MD Gastroenterology  [Time Spent: ___ minutes] : I have spent [unfilled] minutes of time on the encounter.

## 2023-12-28 NOTE — HISTORY OF PRESENT ILLNESS
[FreeTextEntry1] : 73M with pmh pre DM, HLD, CAD s/p PCI at [facility] (2017) referred by PCP for colon cancer screening.  Denies any GI complaints: diarrhea, constipation, rectal bleeding, melena, odynophagia, dysphagia, heart burn, epigastric/ abdominal pain, n/v, unintentional weight loss  Last colonoscopy many years ago in [place], cannot remember when. Noted to have polyps  Family Hx: no Gi malignancy; brother with lymphoma Tob never ETOH never Drug use never Meds: ASA 81, metformin, rosuvastatin, metoprolol, vit d, vit c  Notes EID first few min of walking that then resolves after a few minutes and then becomes normal afterwards.  Underwent complete cardiac work up with Cath, echo and all unremarkable.

## 2023-12-28 NOTE — ASSESSMENT
[FreeTextEntry1] : 73M with pmh pre DM, HLD, CAD s/p PCI at [facility] (2017) referred by PCP for colon cancer screening.  # Colon cancer screening - Plan for colonoscopy @[facility] with suprep - Details of procedure, including risks of anesthesia and procedure which include but not limited to bleeding, perforation, infection, missed polyp discussed and patient gives verbal consent. Written consent to be obtained at time of procedure. - Prep instructions discussed at length - Pt advised an escort is needed to  from procedure   Nae Cedillo MD GI Fellow

## 2023-12-28 NOTE — PHYSICAL EXAM
[Abnormal Walk] : normal gait [Normal Color / Pigmentation] : normal skin color and pigmentation [Normal] : oriented to person, place, and time

## 2023-12-28 NOTE — ASSESSMENT
[FreeTextEntry1] : 73M with pmh pre DM, HLD, CAD s/p PCI at [facility] (2017) referred by PCP for colon cancer screening.  # Colon cancer screening - [facility] with suprep - Details of procedure, including risks of anesthesia and procedure which include but not limited to bleeding, perforation, infection, missed polyp discussed and patient gives verbal consent. Written consent to be obtained at time of procedure. - Prep instructions discussed at length - Pt advised an escort is needed to  from procedure   Nae Cedillo MD GI Fellow

## 2023-12-28 NOTE — END OF VISIT
[] : Fellow [FreeTextEntry3] : 73M with a h/o pre-DM, HLD, CAD s/p PCI at [facility] (2017) referred by PCP for colon cancer screening. Schedule at [facility] with suprep.   Joe Eaton MD Gastroenterology  [Time Spent: ___ minutes] : I have spent [unfilled] minutes of time on the encounter.

## 2024-03-11 ENCOUNTER — APPOINTMENT (OUTPATIENT)
Dept: GASTROENTEROLOGY | Facility: HOSPITAL | Age: 74
End: 2024-03-11

## 2024-04-30 ENCOUNTER — OUTPATIENT (OUTPATIENT)
Dept: OUTPATIENT SERVICES | Facility: HOSPITAL | Age: 74
LOS: 1 days | End: 2024-04-30
Payer: MEDICARE

## 2024-04-30 ENCOUNTER — APPOINTMENT (OUTPATIENT)
Dept: MRI IMAGING | Facility: HOSPITAL | Age: 74
End: 2024-04-30
Payer: MEDICARE

## 2024-04-30 PROCEDURE — 72197 MRI PELVIS W/O & W/DYE: CPT

## 2024-04-30 PROCEDURE — 72197 MRI PELVIS W/O & W/DYE: CPT | Mod: 26

## 2024-04-30 PROCEDURE — A9585: CPT

## 2024-05-20 ENCOUNTER — NON-APPOINTMENT (OUTPATIENT)
Age: 74
End: 2024-05-20

## 2024-05-20 ENCOUNTER — APPOINTMENT (OUTPATIENT)
Dept: OPHTHALMOLOGY | Facility: CLINIC | Age: 74
End: 2024-05-20
Payer: MEDICARE

## 2024-05-20 ENCOUNTER — APPOINTMENT (OUTPATIENT)
Dept: OPHTHALMOLOGY | Facility: CLINIC | Age: 74
End: 2024-05-20
Payer: SELF-PAY

## 2024-05-20 PROCEDURE — 92015 DETERMINE REFRACTIVE STATE: CPT

## 2024-05-20 PROCEDURE — 92004 COMPRE OPH EXAM NEW PT 1/>: CPT

## 2024-07-10 ENCOUNTER — APPOINTMENT (OUTPATIENT)
Dept: GASTROENTEROLOGY | Facility: CLINIC | Age: 74
End: 2024-07-10
Payer: MEDICARE

## 2024-07-10 VITALS
BODY MASS INDEX: 29.62 KG/M2 | TEMPERATURE: 97.3 F | WEIGHT: 200 LBS | HEART RATE: 81 BPM | RESPIRATION RATE: 16 BRPM | DIASTOLIC BLOOD PRESSURE: 70 MMHG | OXYGEN SATURATION: 98 % | HEIGHT: 69 IN | SYSTOLIC BLOOD PRESSURE: 140 MMHG

## 2024-07-10 DIAGNOSIS — Z12.11 ENCOUNTER FOR SCREENING FOR MALIGNANT NEOPLASM OF COLON: ICD-10-CM

## 2024-07-10 PROCEDURE — 99214 OFFICE O/P EST MOD 30 MIN: CPT

## 2024-08-20 ENCOUNTER — NON-APPOINTMENT (OUTPATIENT)
Age: 74
End: 2024-08-20

## 2024-08-29 ENCOUNTER — APPOINTMENT (OUTPATIENT)
Dept: GASTROENTEROLOGY | Facility: HOSPITAL | Age: 74
End: 2024-08-29

## 2024-08-29 ENCOUNTER — RESULT REVIEW (OUTPATIENT)
Age: 74
End: 2024-08-29

## 2024-08-29 ENCOUNTER — OUTPATIENT (OUTPATIENT)
Dept: OUTPATIENT SERVICES | Facility: HOSPITAL | Age: 74
LOS: 1 days | Discharge: HOME | End: 2024-08-29
Payer: MEDICARE

## 2024-08-29 VITALS
HEIGHT: 69 IN | WEIGHT: 199.96 LBS | SYSTOLIC BLOOD PRESSURE: 146 MMHG | RESPIRATION RATE: 18 BRPM | TEMPERATURE: 98 F | OXYGEN SATURATION: 97 % | DIASTOLIC BLOOD PRESSURE: 67 MMHG | HEART RATE: 74 BPM

## 2024-08-29 LAB
GLUCOSE BLDC GLUCOMTR-MCNC: 74 MG/DL — SIGNIFICANT CHANGE UP (ref 70–99)
GLUCOSE BLDC GLUCOMTR-MCNC: 80 MG/DL — SIGNIFICANT CHANGE UP (ref 70–99)

## 2024-08-29 PROCEDURE — 88305 TISSUE EXAM BY PATHOLOGIST: CPT

## 2024-08-29 PROCEDURE — 88305 TISSUE EXAM BY PATHOLOGIST: CPT | Mod: 26

## 2024-08-29 PROCEDURE — 45380 COLONOSCOPY AND BIOPSY: CPT

## 2024-08-29 PROCEDURE — 82962 GLUCOSE BLOOD TEST: CPT

## 2024-08-29 NOTE — PRE-ANESTHESIA EVALUATION ADULT - NSANTHPMHFT_GEN_ALL_CORE
Cardiac: Positive for HTN, HLD, CAD s/p PCI 2017, Heart Failure. Denies MI/Angina. Denies Arrhythmia, Murmur/Valvular Disorder. >4 METS  Pulmonary: Denies Asthma, COPD, LILY  Renal: Denies kidney dysfunction  Hepatic: Denies liver dysfunction  Gastrointestinal: Denies GERD/IBS  Endocrine: Positive for prediabetes. Denies thyroid dysfunction.  Neurologic: Denies stroke/seizure disorder.   Hematologic: Denies anemia, blood clotting disorder, blood thinning medication.    PSH: PCI 2017.

## 2024-08-29 NOTE — PRE-ANESTHESIA EVALUATION ADULT - NSRADCARDRESULTSFT_GEN_ALL_CORE
All available imaging reviewed. All available imaging reviewed.    TTE 9/13/2023  "CONCLUSIONS:     1. Mild symmetric left ventricular hypertrophy.   2. Mildly reduced left ventricular systolic function, LVEF 40-45%.   3. Apical septal segment, apical inferior segment, and apex are akinetic. Apical lateral segment is hypokinetic.   4. Grade I left ventricular diastolic dysfunction.   5. Normal right ventricular size and systolic function.   6. Aortic sclerosis without significant stenosis.   7. No evidence of pulmonary hypertension, pulmonary artery systolic pressure is 29 mmHg.   8. No pericardial effusion.   9. No prior echo is available for comparison."    CT Angiography Cardiac with IV Contrast 9/13/2023  "Cardiac:  1.  Less than 25% stenosis of left main coronary artery.  2.  Patent stent mid to distal LAD.  3.  Calcific plaque in the D1 where significant stenosis cannot be excluded.  4.  Severe stenosis of OM1 and RPL.  5.  RPDA not well visualized.  6.  Remaining segments demonstrate non-obstructive coronary artery disease."    Cardiac Catheterization 9/25/2023  "Coronary Angiography     The coronary circulation is right dominant.      LM   Left main artery: Luminal irregularities.      LAD   Left anterior descending artery: Proximal vessel severely ectatic. Mid LAD with 50% non-obstructive disease IFR negative at 0.94. Distal LAD prior stent with mild-moderate ISR, also IFR negative at 0.94. Apical LAD with , and bridging L-L collaterals..      CX   Circumflex: Large ectatic, 20-30% diffuse non-obstructive disease. .      RCA   Right coronary artery: High anterior take-off. Small caliber vessel.  50-60% moderate non-obstructive disease of mid RCA.  RPDA free of disease.      Left Heart Cath   Ejection fraction was not calculated. LV to AO pullback was not performed."

## 2024-08-30 LAB — SURGICAL PATHOLOGY STUDY: SIGNIFICANT CHANGE UP

## 2024-09-08 ENCOUNTER — NON-APPOINTMENT (OUTPATIENT)
Age: 74
End: 2024-09-08

## 2025-05-19 ENCOUNTER — APPOINTMENT (OUTPATIENT)
Dept: OPHTHALMOLOGY | Facility: CLINIC | Age: 75
End: 2025-05-19
Payer: MEDICARE

## 2025-05-19 ENCOUNTER — NON-APPOINTMENT (OUTPATIENT)
Age: 75
End: 2025-05-19

## 2025-05-19 PROCEDURE — 92015 DETERMINE REFRACTIVE STATE: CPT

## 2025-05-19 PROCEDURE — 92014 COMPRE OPH EXAM EST PT 1/>: CPT

## 2025-05-19 PROCEDURE — 92133 CPTRZD OPH DX IMG PST SGM ON: CPT

## (undated) DEVICE — FORCEP RADIAL JAW 4 JUMBO 2.8MM 3.2MM 240CM ORANGE DISP